# Patient Record
Sex: FEMALE
[De-identification: names, ages, dates, MRNs, and addresses within clinical notes are randomized per-mention and may not be internally consistent; named-entity substitution may affect disease eponyms.]

---

## 2017-04-24 PROBLEM — Z00.00 ENCOUNTER FOR PREVENTIVE HEALTH EXAMINATION: Noted: 2017-04-24

## 2017-04-24 PROBLEM — Z00.00 ENCOUNTER FOR PREVENTIVE HEALTH EXAMINATION: Status: ACTIVE | Noted: 2017-04-24

## 2017-05-01 ENCOUNTER — RX RENEWAL (OUTPATIENT)
Age: 67
End: 2017-05-01

## 2017-06-19 ENCOUNTER — APPOINTMENT (OUTPATIENT)
Dept: ENDOCRINOLOGY | Facility: CLINIC | Age: 67
End: 2017-06-19

## 2017-06-19 ENCOUNTER — LABORATORY RESULT (OUTPATIENT)
Age: 67
End: 2017-06-19

## 2017-06-19 VITALS
SYSTOLIC BLOOD PRESSURE: 138 MMHG | HEART RATE: 62 BPM | BODY MASS INDEX: 41.22 KG/M2 | HEIGHT: 60.5 IN | DIASTOLIC BLOOD PRESSURE: 70 MMHG | WEIGHT: 215.5 LBS

## 2017-06-19 DIAGNOSIS — Z80.0 FAMILY HISTORY OF MALIGNANT NEOPLASM OF DIGESTIVE ORGANS: ICD-10-CM

## 2017-06-22 LAB
T4 FREE SERPL-MCNC: 2 NG/DL
THYROGLOB AB SERPL-ACNC: <20 IU/ML
THYROGLOB SERPL-MCNC: <0.2 NG/ML
TSH SERPL-ACNC: 0.03 UIU/ML

## 2018-02-14 ENCOUNTER — OTHER (OUTPATIENT)
Age: 68
End: 2018-02-14

## 2018-02-22 ENCOUNTER — APPOINTMENT (OUTPATIENT)
Dept: ENDOCRINOLOGY | Facility: CLINIC | Age: 68
End: 2018-02-22
Payer: MEDICARE

## 2018-02-22 VITALS
WEIGHT: 223 LBS | BODY MASS INDEX: 42.65 KG/M2 | SYSTOLIC BLOOD PRESSURE: 143 MMHG | HEIGHT: 60.5 IN | DIASTOLIC BLOOD PRESSURE: 71 MMHG | HEART RATE: 64 BPM

## 2018-02-22 PROCEDURE — 99214 OFFICE O/P EST MOD 30 MIN: CPT

## 2018-03-07 LAB
25(OH)D3 SERPL-MCNC: 23.6 NG/ML
ALBUMIN SERPL ELPH-MCNC: 3.9 G/DL
ALP BLD-CCNC: 122 U/L
ALT SERPL-CCNC: 20 U/L
ANION GAP SERPL CALC-SCNC: 14 MMOL/L
AST SERPL-CCNC: 25 U/L
BILIRUB SERPL-MCNC: 0.4 MG/DL
BUN SERPL-MCNC: 23 MG/DL
CALCIUM SERPL-MCNC: 8.6 MG/DL
CHLORIDE SERPL-SCNC: 104 MMOL/L
CO2 SERPL-SCNC: 25 MMOL/L
CREAT SERPL-MCNC: 0.82 MG/DL
GLUCOSE SERPL-MCNC: 113 MG/DL
POTASSIUM SERPL-SCNC: 4.2 MMOL/L
PROT SERPL-MCNC: 6.2 G/DL
SODIUM SERPL-SCNC: 143 MMOL/L
T4 FREE SERPL-MCNC: 2.2 NG/DL
THYROGLOB AB SERPL-ACNC: <20 IU/ML
THYROGLOB SERPL-MCNC: <0.2 NG/ML
TSH SERPL-ACNC: 0.02 UIU/ML

## 2018-07-23 ENCOUNTER — RESULT REVIEW (OUTPATIENT)
Age: 68
End: 2018-07-23

## 2018-08-20 ENCOUNTER — APPOINTMENT (OUTPATIENT)
Dept: ENDOCRINOLOGY | Facility: CLINIC | Age: 68
End: 2018-08-20
Payer: MEDICARE

## 2018-08-20 ENCOUNTER — LABORATORY RESULT (OUTPATIENT)
Age: 68
End: 2018-08-20

## 2018-08-20 VITALS
HEIGHT: 60.5 IN | DIASTOLIC BLOOD PRESSURE: 69 MMHG | WEIGHT: 224 LBS | BODY MASS INDEX: 42.84 KG/M2 | SYSTOLIC BLOOD PRESSURE: 138 MMHG | HEART RATE: 60 BPM

## 2018-08-20 PROCEDURE — 36415 COLL VENOUS BLD VENIPUNCTURE: CPT

## 2018-08-20 PROCEDURE — 99214 OFFICE O/P EST MOD 30 MIN: CPT | Mod: 25

## 2018-09-24 LAB
T3FREE SERPL-MCNC: 2.66 PG/ML
T4 FREE SERPL-MCNC: 2 NG/DL
THYROGLOB AB SERPL-ACNC: <20 IU/ML
THYROGLOB SERPL-MCNC: <0.2 NG/ML
TSH SERPL-ACNC: 0.04 UIU/ML

## 2018-10-20 ENCOUNTER — RX RENEWAL (OUTPATIENT)
Age: 68
End: 2018-10-20

## 2018-10-20 ENCOUNTER — RESULT CHARGE (OUTPATIENT)
Age: 68
End: 2018-10-20

## 2018-10-20 RX ORDER — LEVOTHYROXINE SODIUM 0.17 MG/1
175 TABLET ORAL DAILY
Qty: 90 | Refills: 2 | Status: DISCONTINUED | COMMUNITY
Start: 2018-02-23 | End: 2018-10-20

## 2018-10-20 RX ORDER — LEVOTHYROXINE SODIUM 0.17 MG/1
175 TABLET ORAL
Qty: 90 | Refills: 2 | Status: DISCONTINUED | COMMUNITY
Start: 2017-05-01 | End: 2018-10-20

## 2019-02-01 ENCOUNTER — OTHER (OUTPATIENT)
Age: 69
End: 2019-02-01

## 2019-03-14 ENCOUNTER — APPOINTMENT (OUTPATIENT)
Dept: ENDOCRINOLOGY | Facility: CLINIC | Age: 69
End: 2019-03-14
Payer: MEDICARE

## 2019-03-14 VITALS
HEART RATE: 64 BPM | WEIGHT: 222 LBS | HEIGHT: 60.5 IN | DIASTOLIC BLOOD PRESSURE: 80 MMHG | BODY MASS INDEX: 42.46 KG/M2 | SYSTOLIC BLOOD PRESSURE: 136 MMHG

## 2019-03-14 PROCEDURE — 99215 OFFICE O/P EST HI 40 MIN: CPT

## 2019-03-14 NOTE — PHYSICAL EXAM
[Alert] : alert [Normal Sclera/Conjunctiva] : normal sclera/conjunctiva [Normal Outer Ear/Nose] : the ears and nose were normal in appearance [No Neck Mass] : no neck mass was observed [No Respiratory Distress] : no respiratory distress [Spine Straight] : spine straight [No Stigmata of Cushings Syndrome] : no stigmata of cushings syndrome [Normal Gait] : normal gait [No Rash] : no rash [Normal Reflexes] : deep tendon reflexes were 2+ and symmetric [Oriented x3] : oriented to person, place, and time

## 2019-03-15 NOTE — HISTORY OF PRESENT ILLNESS
[FreeTextEntry1] : \par 69 y/o F, total thyroidectomy for PTCA, central LN neck dissection,5/8 LN mets ( February 6, 2013). PMHx: HTN, gastric bypass in February 2012, mild elevation of intact PTH, presents today for thyroid f/u. \par \par 3/20/13: I131 operation with 146 mCi\par 3/2013: WBS, distant metastases unlikely\par 8/10/15 thyroid ultrasound: Left side level IV with new nodule no visualized it before\par 6/10/16 thyroid ultrasound, no thyroid bed nodule suspicious appearing lymph nodes\par 7/17/17: thyroid u/s: No nodule seen in the thyroid bed\par 2/22/18:  t4 free 2.2, tsh 0.02,thyroglobulin <0.20, neg thyroglobulin ab\par 2/26/19:  TSH 0.02, total t4 14.1, no thyroglobulin test done \par 2/27/19 thyroid US s/p total thyroidectomy: no residual thyroid tissue within the thyroid bed. \par \par Pt presents today, asymptomatic. Pt denies voice changes, difficulty swallowing. \par No sob, no chest pain\par Medicines, levothyroxine 175 mcg, Amlodipine-benazepril 5-20 mg daily, and vitamins\par

## 2019-03-15 NOTE — END OF VISIT
[>50% of Time Spent on Counseling for ____] : Greater than 50% of the encounter time was spent on counseling for [unfilled] [Time Spent: ___ minutes] : I have spent [unfilled] minutes of face to face time with the patient [FreeTextEntry3] : All medical record entries made by the Scribe were at my, Dr. Medardo Gage, direction and personally dictated by me on 03/14/2019. I have reviewed the chart and agree that the record accurately reflects my personal performance of the history, physical exam, assessment and plan. I have also personally directed, reviewed and agreed with the chart. \par \par

## 2019-03-15 NOTE — ASSESSMENT
[Levothyroxine] : The patient was instructed to take Levothyroxine on an empty stomach, separate from vitamins, and wait at least 30 minutes before eating [Importance of Diet and Exercise] : importance of diet and exercise to improve glycemic control, achieve weight loss and improve cardiovascular health [FreeTextEntry1] : 67 y/o F, total thyroidectomy for PTCA, central LN neck dissection,5/8 LN mets ( February 6, 2013)\par 3/20/13, She received I131 ablation with 146 mCi\par \par 1. No biochemical evidence of recurrence. Pt will continue with 175 mcg and return in 6 months for surveillance. \par \par 2. BMI 42.64. Pt is at increased risk to develop DM and comorbidities associated with obesity. Again discussed benefits of weight loss. Order metabolic profile and A1c. \par \par Return in 6 months.

## 2019-03-15 NOTE — ADDENDUM
[FreeTextEntry1] : I, Rosemary Cummins, acted soley as a scribe for Dr. Medardo Gage on this date. 03/14/2019.

## 2019-03-26 LAB
ALBUMIN SERPL ELPH-MCNC: 4.1 G/DL
ALP BLD-CCNC: 106 U/L
ALT SERPL-CCNC: 31 U/L
ANION GAP SERPL CALC-SCNC: 13 MMOL/L
AST SERPL-CCNC: 44 U/L
BILIRUB SERPL-MCNC: 0.2 MG/DL
BUN SERPL-MCNC: 14 MG/DL
CALCIUM SERPL-MCNC: 8.7 MG/DL
CHLORIDE SERPL-SCNC: 103 MMOL/L
CHOLEST SERPL-MCNC: 202 MG/DL
CHOLEST/HDLC SERPL: 3.1 RATIO
CO2 SERPL-SCNC: 24 MMOL/L
CREAT SERPL-MCNC: 0.81 MG/DL
CREAT SPEC-SCNC: 298 MG/DL
GLUCOSE SERPL-MCNC: 115 MG/DL
HBA1C MFR BLD HPLC: 6 %
HDLC SERPL-MCNC: 66 MG/DL
LDLC SERPL CALC-MCNC: 116 MG/DL
MICROALBUMIN 24H UR DL<=1MG/L-MCNC: 8.9 MG/DL
MICROALBUMIN/CREAT 24H UR-RTO: 30 MG/G
POTASSIUM SERPL-SCNC: 4.4 MMOL/L
PROT SERPL-MCNC: 6.3 G/DL
SODIUM SERPL-SCNC: 140 MMOL/L
TRIGL SERPL-MCNC: 100 MG/DL

## 2019-05-07 ENCOUNTER — OTHER (OUTPATIENT)
Age: 69
End: 2019-05-07

## 2019-09-24 ENCOUNTER — OTHER (OUTPATIENT)
Age: 69
End: 2019-09-24

## 2019-10-28 ENCOUNTER — RESULT REVIEW (OUTPATIENT)
Age: 69
End: 2019-10-28

## 2019-11-04 ENCOUNTER — RX RENEWAL (OUTPATIENT)
Age: 69
End: 2019-11-04

## 2019-11-04 ENCOUNTER — OTHER (OUTPATIENT)
Age: 69
End: 2019-11-04

## 2019-12-03 ENCOUNTER — APPOINTMENT (OUTPATIENT)
Dept: ENDOCRINOLOGY | Facility: CLINIC | Age: 69
End: 2019-12-03
Payer: MEDICARE

## 2019-12-03 VITALS
DIASTOLIC BLOOD PRESSURE: 87 MMHG | HEART RATE: 65 BPM | SYSTOLIC BLOOD PRESSURE: 149 MMHG | WEIGHT: 226 LBS | BODY MASS INDEX: 43.41 KG/M2

## 2019-12-03 PROCEDURE — 99214 OFFICE O/P EST MOD 30 MIN: CPT

## 2019-12-05 NOTE — REVIEW OF SYSTEMS
[Negative] : Endocrine [Recent Weight Gain (___ Lbs)] : recent [unfilled] ~Ulb weight gain [Blurry Vision] : blurred vision [As Noted in HPI] : as noted in HPI [Nocturia] : nocturia [FreeTextEntry4] : denies swallowing/breathing difficulties

## 2019-12-05 NOTE — HISTORY OF PRESENT ILLNESS
[FreeTextEntry1] : 68 y/o year female with hx of total thyroidectomy with central LN neck dissection for PTCA,5/8 LN mets ( February 6, 2013)\par 3/20/13: I131 radioiodine ablation with 146 mCi. 3/2013: WBS, distant metastases unlikely\par 7/17/17: thyroid u/s: No nodule seen in the thyroid bed\par 2/22/18:  t4 free 2.2, tsh 0.02,thyroglobulin <0.20, neg thyroglobulin ab\par 9/30/19 A1c 6.0%, TSH 0.40, Free T4 2.8, Free T3 2.8, Thyroglobulin 0.1, Thyroglobulin ab <1, Vit D 25-OH 23,  s. creat 0.77.\par 11/5/19 Thyroid US: Impression: s/p total thyroidectomy without sonographic evidence of local recurrence or cervical LN \par \par 68 y/o F pt with Hx of total thyroidectomy for PTCA, central LN neck dissection, 5/8 LN mets (February 6, 2013). \par Other PMHx: pre-DM, HTN, gastric bypass in February 2012, mild elevation of intact PTH\par Saw podiatrist and OB/GYN\par \par 3/14/19\par Pt presents today, asymptomatic. Pt denies voice changes, difficulty swallowing. \par No sob, no chest pain\par \par 12/3/19\par Today pt presents for thyroid f/u, feeling well with c/o L knee discomfort, occasional blurry vision, and nocturia which she relates to drinking water more at night. Pt reports she did a mammogram and colonoscopy recently. \par Pt gained 4lbs since her last visit here.\par Denies swallowing/breathing difficulties. \par \par Medicines, levothyroxine 150 mcg, Amlodipine- benazepril 5-20 mg daily, Vitamin D, Vit B complex

## 2019-12-05 NOTE — ASSESSMENT
[Importance of Diet and Exercise] : importance of diet and exercise to improve glycemic control, achieve weight loss and improve cardiovascular health [Levothyroxine] : The patient was instructed to take Levothyroxine on an empty stomach, separate from vitamins, and wait at least 30 minutes before eating [FreeTextEntry1] : 68 y/o F pt with:\par 1. Hx of total thyroidectomy for PTCA, central LN neck dissection, 5/8 LN mets (February 6, 2013):\par Pt received LOUIS ablation 146 mCI in 2013; she is asymptomatic and there is no biochemical evidence of recurrence. Recommend pt continue with Levothyroxine 150mcg with a TSH goal in the low normal.\par \par 2. Hx of pre- DM, with recent A1c of 6%\par Pt is gaining weight, and has limited mobility 2/2 L knee pain. Pt's risk for developing DM and cardiac events has increased; she has high LDL-c. Recommend pt work on lifestyle and diet modification. \par Will initiate Metformin 850mg BID (pt states she will start it after returning from a cruise). If there is no clinical improvement recommend pt return earlier than 1 yr.\par \par Return in 1 yr.

## 2019-12-05 NOTE — END OF VISIT
[>50% of Time Spent on Counseling for ____] : Greater than 50% of the encounter time was spent on counseling for [unfilled] [FreeTextEntry3] : All medical record entries made by the Scribe were at my, Dr. Medardo Gage, direction and personally dictated by me on 12/03/2019. I have reviewed the chart and agree that the record accurately reflects my personal performance of the history, physical exam, assessment and plan. I have also personally directed, reviewed and agreed with the chart.  [Time Spent: ___ minutes] : I have spent [unfilled] minutes of face to face time with the patient

## 2019-12-05 NOTE — ADDENDUM
[FreeTextEntry1] : I, Phi Valdovinos, acted solely as a scribe for Dr. Medardo Gage on this date. 12/03/2019.

## 2019-12-05 NOTE — PHYSICAL EXAM
[Alert] : alert [Normal Sclera/Conjunctiva] : normal sclera/conjunctiva [No Respiratory Distress] : no respiratory distress [Normal Outer Ear/Nose] : the ears and nose were normal in appearance [No Neck Mass] : no neck mass was observed [Spine Straight] : spine straight [No Stigmata of Cushings Syndrome] : no stigmata of cushings syndrome [Normal Gait] : normal gait [No Rash] : no rash [Normal Reflexes] : deep tendon reflexes were 2+ and symmetric [Oriented x3] : oriented to person, place, and time [Thyroid Not Enlarged] : the thyroid was not enlarged [No Thyroid Nodules] : there were no palpable thyroid nodules [Normal S1, S2] : normal S1 and S2 [Clear to Auscultation] : lungs were clear to auscultation bilaterally [Normal Rate] : heart rate was normal  [Pedal Pulses Normal] : the pedal pulses are present [Normal Bowel Sounds] : normal bowel sounds [de-identified] : no masses, no tenderness, no lymph node enlargement

## 2020-09-14 ENCOUNTER — APPOINTMENT (OUTPATIENT)
Dept: ENDOCRINOLOGY | Facility: CLINIC | Age: 70
End: 2020-09-14
Payer: MEDICARE

## 2020-09-14 VITALS
HEIGHT: 60.5 IN | SYSTOLIC BLOOD PRESSURE: 133 MMHG | WEIGHT: 213 LBS | BODY MASS INDEX: 40.74 KG/M2 | HEART RATE: 64 BPM | DIASTOLIC BLOOD PRESSURE: 74 MMHG

## 2020-09-14 DIAGNOSIS — M85.851 OTHER SPECIFIED DISORDERS OF BONE DENSITY AND STRUCTURE, RIGHT THIGH: ICD-10-CM

## 2020-09-14 PROCEDURE — 99215 OFFICE O/P EST HI 40 MIN: CPT

## 2020-09-14 NOTE — REASON FOR VISIT
[Weight Management/Obesity] : weight management/obesity [Follow - Up] : a follow-up visit [Thyroid Cancer] : thyroid cancer [Other___] : [unfilled]

## 2020-09-16 NOTE — PHYSICAL EXAM
[Alert] : alert [Normal Sclera/Conjunctiva] : normal sclera/conjunctiva [Normal Outer Ear/Nose] : the ears and nose were normal in appearance [No Neck Mass] : no neck mass was observed [Thyroid Not Enlarged] : the thyroid was not enlarged [No Thyroid Nodules] : no palpable thyroid nodules [Clear to Auscultation] : lungs were clear to auscultation bilaterally [Normal S1, S2] : normal S1 and S2 [Pedal Pulses Normal] : the pedal pulses are present [Normal Rate] : heart rate was normal [Spine Straight] : spine straight [Normal Bowel Sounds] : normal bowel sounds [No Stigmata of Cushings Syndrome] : no stigmata of Cushings Syndrome [Normal Gait] : normal gait [No Rash] : no rash [Normal Reflexes] : deep tendon reflexes were 2+ and symmetric [Oriented x3] : oriented to person, place, and time [de-identified] : no tenderness, no lymph node enlargement

## 2020-09-16 NOTE — HISTORY OF PRESENT ILLNESS
[FreeTextEntry1] : 3/20/13: I131 radioiodine ablation with 146 mCi. \par 3/2013: WBS, distant metastases unlikely\par 3/29/17 iPTH 111, Ca 8.6, Vit D 25-OH 25 \par 7/17/17: thyroid u/s: No nodule seen in the thyroid bed\par 2/22/18:  t4 free 2.2, tsh 0.02,thyroglobulin <0.20, neg thyroglobulin ab\par 7/9/18 BMD DXA R Proximal Femur T Score is - 2.4.\par 9/30/19 A1c 6.0%, TSH 0.40, Free T4 2.8, Free T3 2.8, Thyroglobulin less than 0.1, Thyroglobulin ab <1, Vit D 25-OH 23, LDL-c 125, s. creat 0.77.\par 11/5/19 Thyroid US: Impression: s/p total thyroidectomy without sonographic evidence of local recurrence or cervical LN \par 8/11/20 A1c 5.6%, Free T4 1.8, Thyroglobulin less than 0.1, TSH 0.07, Ca 8.6, LDL-c 123, Vit D 25- OH 32\par \par 69 y/o F pt with Hx of total thyroidectomy for PTCA, central LN neck dissection, 5/8 LN mets (February 6, 2013). \par Other PMHx: pre-DM, HTN, gastric bypass in February 2012, mild elevation of intact PTH\par Saw podiatrist and OB/GYN\par \par 3/14/19\par Pt presents today, asymptomatic. Pt denies voice changes, difficulty swallowing. \par No sob, no chest pain\par \par 12/3/19\par Today pt presents for thyroid f/u, feeling well with c/o L knee discomfort, occasional blurry vision, and nocturia which she relates to drinking water more at night. Pt reports she did a mammogram and colonoscopy recently. \par Pt gained 4lbs since her last visit here.\par Denies swallowing/breathing difficulties. \par \par 9/14/20\par Today pt presents for thyroid f/u, feeling well with no major physical complaints.\par She notes she saw PCP on 8/24/20; pt states she started SImvastatin 10 mg QD and that she only took Metformin for 2-3 weeks 12 months ago and has discontinued it. \par Pt lost 13lbs since 12/2019.\par \par Current Medications: Levothyroxine 150 mcg, Simvastatin 10 mg QD, Amlodipine- benazepril 5-20 mg daily, Vitamin D, Vit B complex

## 2020-09-16 NOTE — ASSESSMENT
[Importance of Diet and Exercise] : importance of diet and exercise to improve glycemic control, achieve weight loss and improve cardiovascular health [Levothyroxine] : The patient was instructed to take Levothyroxine on an empty stomach, separate from vitamins, and wait at least 30 minutes before eating [Carbohydrate Consistent Diet] : carbohydrate consistent diet [FreeTextEntry1] : 71 y/o F pt with:\par 1. Hx of total thyroidectomy for PTCA, central LN neck dissection, 5/8 LN mets (February 6, 2013):\par Pt received LOUIS ablation 146 mCI in 2013. In 8/2020 Thyroglobulin was less than 0.1 and Free T4 was 1.8. Pt is asymptomatic, will order thyroid US and recommend pt continue LT4 150mcg QD. \par \par 2. Obesity:\par She has made changes to her lifestyle and diet, as result she lost 13lbs. \par \par 3. Pre- DM:\par Pt had an A1c of 6% in 3/2019. Her recent A1c was 5.6% in 8/2020. She was rx Metformin which she took for 3 weeks however she DC/ed 2/2 dizziness symptoms. Encouraged pt to continue her good eating habits, focus on quadriceps muscle bulk and stretching exercises. \par \par 4. Osteopenia with a T- Score - 2.4 at R Femur:\par Recommend pt work on active lifestyle, improve muscle tone of lower extremities, take Vit D and Ca supplements (years ago pt had an increase iPTH with a Vit D-OH of 25). Will order a BMD.\par \par Return in 4- 5 months.

## 2020-09-16 NOTE — END OF VISIT
[Time Spent: ___ minutes] : I have spent [unfilled] minutes of time on the encounter. [>50% of the face to face encounter time was spent on counseling and/or coordination of care for ___] : Greater than 50% of the face to face encounter time was spent on counseling and/or coordination of care for [unfilled] [FreeTextEntry3] : All medical record entries made by the Scribe were at my, Dr. Medardo Gage, direction and personally dictated by me on 09/14/2020. I have reviewed the chart and agree that the record accurately reflects my personal performance of the history, physical exam, assessment and plan. I have also personally directed, reviewed and agreed with the chart.

## 2020-09-16 NOTE — ADDENDUM
[FreeTextEntry1] : I, Phi Valdovinos, acted solely as a scribe for Dr. Medardo Gage on this date. 09/14/2020.

## 2020-12-06 ENCOUNTER — RESULT REVIEW (OUTPATIENT)
Age: 70
End: 2020-12-06

## 2021-10-18 ENCOUNTER — APPOINTMENT (OUTPATIENT)
Dept: ENDOCRINOLOGY | Facility: CLINIC | Age: 71
End: 2021-10-18
Payer: MEDICARE

## 2021-10-18 VITALS
WEIGHT: 213 LBS | SYSTOLIC BLOOD PRESSURE: 154 MMHG | DIASTOLIC BLOOD PRESSURE: 85 MMHG | BODY MASS INDEX: 40.74 KG/M2 | HEIGHT: 60.5 IN | HEART RATE: 64 BPM

## 2021-10-18 PROCEDURE — 99215 OFFICE O/P EST HI 40 MIN: CPT

## 2021-10-18 NOTE — REASON FOR VISIT
[Follow - Up] : a follow-up visit [Weight Management/Obesity] : weight management/obesity [Thyroid Cancer] : thyroid cancer [Other___] : [unfilled]

## 2021-10-20 NOTE — HISTORY OF PRESENT ILLNESS
[FreeTextEntry1] : 69 y/o F pt with Hx of total thyroidectomy for PTCA, central LN neck dissection, 5/8 LN mets (February 6, 2013). \par Other PMHx: pre-DM, HTN, gastric bypass in February 2012, mild elevation of intact PTH\par Saw podiatrist and OB/GYN\par \par 3/14/19\par Pt presents today, asymptomatic. Pt denies voice changes, difficulty swallowing. \par No sob, no chest pain\par \par 12/3/19\par Today pt presents for thyroid f/u, feeling well with c/o L knee discomfort, occasional blurry vision, and nocturia which she relates to drinking water more at night. Pt reports she did a mammogram and colonoscopy recently. \par Pt gained 4lbs since her last visit here.\par Denies swallowing/breathing difficulties. \par \par 9/14/20\par Today pt presents for thyroid f/u, feeling well with no major physical complaints.\par She notes she saw PCP on 8/24/20; pt states she started SImvastatin 10 mg QD and that she only took Metformin for 2-3 weeks 12 months ago and has discontinued it. \par Pt lost 13lbs since 12/2019.\par \par 10/18/21\par The patient reports a positive COVID PCR nasal swab in August following travel to [place]. She had her last COVID vaccine dose in February 2021. Last saw PCP in August 2021, with no reported acute issues. She is feeling well, with no dyspnea, vision issues, or palpitations.\par \par Current Medications: Levothyroxine 150 mcg, Simvastatin 10 mg QD, Amlodipine- benazepril 5-20 mg daily, Vitamin D, Vit B complex\par \par Labs\par 3/20/13: I131 radioiodine ablation with 146 mCi. \par 3/2013: WBS, distant metastases unlikely\par 3/29/17 iPTH 111, Ca 8.6, Vit D 25-OH 25 \par 7/17/17: thyroid u/s: No nodule seen in the thyroid bed\par 2/22/18:  t4 free 2.2, tsh 0.02,thyroglobulin <0.20, neg thyroglobulin ab\par 7/9/18 BMD DXA R Proximal Femur T Score is - 2.4.\par 9/30/19 A1c 6.0%, TSH 0.40, Free T4 2.8, Free T3 2.8, Thyroglobulin less than 0.1, Thyroglobulin ab <1, Vit D 25-OH 23, LDL-c 125, s. creat 0.77.\par 11/5/19 Thyroid US: Impression: s/p total thyroidectomy without sonographic evidence of local recurrence or cervical LN \par 8/11/20 A1c 5.6%, Free T4 1.8, Thyroglobulin less than 0.1, TSH 0.07, Ca 8.6, LDL-c 123, Vit D 25- OH 32\par - 06/29/21: HGB A1c 5.7%, cholesterol 203, LDL-c 106, free T4 1.7, thyroglobulin <0.1, thyroglobulin ab <1, TSH 0.02, creatinine 0.77\par - 10/11/21: thyroglobulin 0.1, thyroglobulin ab <1, T4 total 12.4

## 2021-10-20 NOTE — ADDENDUM
[FreeTextEntry1] : All medical record entries made by the Scribe were at my, Dr. Medardo Gage, direction and personally dictated by me on 10/18/2021. I have reviewed the chart and agree that the record accurately reflects my personal performance of the history, physical exam, assessment and plan. I have also personally directed, reviewed, and agreed with the chart.

## 2021-10-20 NOTE — ASSESSMENT
[FreeTextEntry1] : 70 y/o F pt with:\par \par 1. Hx of total thyroidectomy for PTCA, central LN neck dissection, 5/8 LN mets (February 6, 2013):\par Pt received L[facility]IS ablation 146 mCI in 2013. In 8/2020 Thyroglobulin was less than 0.1 and Free T4 was 1.8. \par Patient is asymptomatic, with no symptoms of upper respiratory obstruction. TFTs done on 10/11/21 show no thyroglobulin.\par Continue LT4 150mcg QD.\par \par 2. Obesity/prediabetes\par Over the past 2 years patient has lost 6 lbs, recent HGB A1c of 5.7%.\par Previously, patient was on metformin, which she developed GI side effects. Over the past few years, we have spoken to patient about increased risk for cardiac and metabolic complications, and today again we spoke about the same and recommend to initiate GLP-1. She states she will obtain more information and discuss with GI/internist.\par \par Return in 1 year [Carbohydrate Consistent Diet] : carbohydrate consistent diet [Weight Loss] : weight loss [Levothyroxine] : The patient was instructed to take Levothyroxine on an empty stomach, separate from vitamins, and wait at least 30 minutes before eating

## 2021-10-20 NOTE — PHYSICAL EXAM
[Alert] : alert [Normal Sclera/Conjunctiva] : normal sclera/conjunctiva [Normal Outer Ear/Nose] : the ears and nose were normal in appearance [No Neck Mass] : no neck mass was observed [Thyroid Not Enlarged] : the thyroid was not enlarged [No Thyroid Nodules] : no palpable thyroid nodules [Clear to Auscultation] : lungs were clear to auscultation bilaterally [Normal S1, S2] : normal S1 and S2 [Normal Rate] : heart rate was normal [Pedal Pulses Normal] : the pedal pulses are present [Normal Bowel Sounds] : normal bowel sounds [Spine Straight] : spine straight [No Stigmata of Cushings Syndrome] : no stigmata of Cushings Syndrome [Normal Gait] : normal gait [No Rash] : no rash [Normal Reflexes] : deep tendon reflexes were 2+ and symmetric [Oriented x3] : oriented to person, place, and time [de-identified] : no tenderness, no lymph node enlargement

## 2022-10-24 ENCOUNTER — LABORATORY RESULT (OUTPATIENT)
Age: 72
End: 2022-10-24

## 2022-10-24 ENCOUNTER — APPOINTMENT (OUTPATIENT)
Dept: ENDOCRINOLOGY | Facility: CLINIC | Age: 72
End: 2022-10-24

## 2022-10-24 VITALS
SYSTOLIC BLOOD PRESSURE: 151 MMHG | DIASTOLIC BLOOD PRESSURE: 71 MMHG | BODY MASS INDEX: 40.15 KG/M2 | HEART RATE: 76 BPM | WEIGHT: 209 LBS

## 2022-10-24 PROCEDURE — 36415 COLL VENOUS BLD VENIPUNCTURE: CPT

## 2022-10-24 PROCEDURE — 99215 OFFICE O/P EST HI 40 MIN: CPT | Mod: 25

## 2022-10-24 NOTE — REASON FOR VISIT
[Follow - Up] : a follow-up visit [Weight Management/Obesity] : weight management/obesity [Thyroid Cancer] : thyroid cancer [Other___] : [unfilled] [DM Type 2] : DM Type 2

## 2022-10-25 NOTE — PHYSICAL EXAM
[Alert] : alert [Normal Sclera/Conjunctiva] : normal sclera/conjunctiva [Normal Outer Ear/Nose] : the ears and nose were normal in appearance [No Neck Mass] : no neck mass was observed [Thyroid Not Enlarged] : the thyroid was not enlarged [No Thyroid Nodules] : no palpable thyroid nodules [Clear to Auscultation] : lungs were clear to auscultation bilaterally [Normal S1, S2] : normal S1 and S2 [Normal Rate] : heart rate was normal [Pedal Pulses Normal] : the pedal pulses are present [Normal Bowel Sounds] : normal bowel sounds [Spine Straight] : spine straight [No Stigmata of Cushings Syndrome] : no stigmata of Cushings Syndrome [Normal Gait] : normal gait [No Rash] : no rash [Normal Reflexes] : deep tendon reflexes were 2+ and symmetric [Oriented x3] : oriented to person, place, and time [de-identified] : no tenderness, no lymph node enlargement

## 2022-10-25 NOTE — ADDENDUM
[FreeTextEntry1] : I Joelsuraj Carr act soley as a scribe for Dr. Medardo Gage on this date. 10/24/2022

## 2022-10-25 NOTE — REVIEW OF SYSTEMS
[Recent Weight Loss (___ Lbs)] : recent weight loss: [unfilled] lbs [Negative] : Heme/Lymph [As Noted in HPI] : as noted in HPI [Dysphagia] : no dysphagia [Dysphonia] : no dysphonia [Difficulty Breathing] : no dyspnea [de-identified] : Itchiness in face.

## 2022-10-25 NOTE — ASSESSMENT
[Levothyroxine] : The patient was instructed to take Levothyroxine on an empty stomach, separate from vitamins, and wait at least 30 minutes before eating [FreeTextEntry1] : \par  [Carbohydrate Consistent Diet] : carbohydrate consistent diet [Importance of Diet and Exercise] : importance of diet and exercise to improve glycemic control, achieve weight loss and improve cardiovascular health [Exercise/Effect on Glucose] : exercise/effect on glucose

## 2022-10-25 NOTE — END OF VISIT
[FreeTextEntry3] : All medical record entries made by the Scribe were at my, Dr. Medardo Gage, direction and personally dictated by me on 10/24/2022. I have reviewed the chart and agree that the record accurately reflects my personal performance of the history, physical exam, assessment and plan. I have also personally directed, reviewed and agreed with the chart.  [Time Spent: ___ minutes] : I have spent [unfilled] minutes of time on the encounter.

## 2022-10-25 NOTE — DATA REVIEWED
[FreeTextEntry1] : Labs:\par - 10/06/22: A1c 5.7% (H), LDL-c 97\par - 07/14/22: A1c 7.1%\par - 01/21/22: A1c 5.9%\par - 10/11/21: thyroglobulin 0.1, thyroglobulin ab <1, T4 total 12.4\par - 06/29/21: HGB A1c 5.7%, cholesterol 203, LDL-c 106, free T4 1.7, thyroglobulin <0.1, thyroglobulin ab <1, TSH 0.02, creatinine 0.77\par - 8/11/20 A1c 5.6%, Free T4 1.8, Thyroglobulin less than 0.1, TSH 0.07, Ca 8.6, LDL-c 123, Vit D 25- OH 32\par - 9/30/19 A1c 6.0%, TSH 0.40, Free T4 2.8, Free T3 2.8, Thyroglobulin less than 0.1, Thyroglobulin ab <1, Vit D 25-OH 23, LDL-c 125, s. creat 0.77.\par - 2/22/18:  t4 free 2.2, tsh 0.02,thyroglobulin <0.20, neg thyroglobulin ab\par - 3/29/17 iPTH 111, Ca 8.6, Vit D 25-OH 25 \par - 3/20/13: I131 radioiodine ablation with 146 mCi. \par - 3/2013: WBS, distant metastases unlikely\par \par Imaging:\par - 11/5/19 Thyroid US: Impression: s/p total thyroidectomy without sonographic evidence of local recurrence or cervical LN \par - 7/9/18 BMD DXA R Proximal Femur T Score is - 2.4.\par - 7/17/17: thyroid u/s: No nodule seen in the thyroid bed\par

## 2022-10-27 ENCOUNTER — NON-APPOINTMENT (OUTPATIENT)
Age: 72
End: 2022-10-27

## 2022-11-20 LAB
T4 FREE SERPL-MCNC: 2.2 NG/DL
THYROGLOB AB SERPL-ACNC: <20 IU/ML
THYROGLOB SERPL-MCNC: <0.2 NG/ML
TSH SERPL-ACNC: 0.01 UIU/ML

## 2022-12-06 ENCOUNTER — NON-APPOINTMENT (OUTPATIENT)
Age: 72
End: 2022-12-06

## 2023-04-24 ENCOUNTER — APPOINTMENT (OUTPATIENT)
Dept: ENDOCRINOLOGY | Facility: CLINIC | Age: 73
End: 2023-04-24

## 2023-07-18 ENCOUNTER — APPOINTMENT (OUTPATIENT)
Dept: ENDOCRINOLOGY | Facility: CLINIC | Age: 73
End: 2023-07-18
Payer: MEDICARE

## 2023-07-18 ENCOUNTER — LABORATORY RESULT (OUTPATIENT)
Age: 73
End: 2023-07-18

## 2023-07-18 VITALS
HEART RATE: 69 BPM | SYSTOLIC BLOOD PRESSURE: 121 MMHG | BODY MASS INDEX: 40.34 KG/M2 | DIASTOLIC BLOOD PRESSURE: 81 MMHG | WEIGHT: 210 LBS

## 2023-07-18 PROCEDURE — 82962 GLUCOSE BLOOD TEST: CPT

## 2023-07-18 PROCEDURE — 99215 OFFICE O/P EST HI 40 MIN: CPT | Mod: 25

## 2023-07-18 PROCEDURE — 83036 HEMOGLOBIN GLYCOSYLATED A1C: CPT | Mod: QW

## 2023-07-18 RX ORDER — SEMAGLUTIDE 0.68 MG/ML
2 INJECTION, SOLUTION SUBCUTANEOUS
Qty: 1 | Refills: 5 | Status: ACTIVE | COMMUNITY
Start: 2023-07-18 | End: 1900-01-01

## 2023-07-18 NOTE — REASON FOR VISIT
[Follow - Up] : a follow-up visit [DM Type 2] : DM Type 2 [Weight Management/Obesity] : weight management/obesity [Thyroid Cancer] : thyroid cancer [Other___] : [unfilled]

## 2023-07-20 ENCOUNTER — NON-APPOINTMENT (OUTPATIENT)
Age: 73
End: 2023-07-20

## 2023-07-21 NOTE — DATA REVIEWED
[FreeTextEntry1] : Labs:\par - 07/10/23: Free T4 1.49, Thyroglobulin ab is negative  \par - 05/24/23: A1c 6.4%, Ca 8.9, LDL-c 116, s.creat 0.79, TSH 4.5, Free t4 1.51   \par - 10/06/22: A1c 5.7% (H), LDL-c 97\par - 07/14/22: A1c 7.1%\par - 01/21/22: A1c 5.9%\par - 10/11/21: thyroglobulin 0.1, thyroglobulin ab <1, T4 total 12.4\par - 06/29/21: HGB A1c 5.7%, cholesterol 203, LDL-c 106, free T4 1.7, thyroglobulin <0.1, thyroglobulin ab <1, TSH 0.02, creatinine 0.77\par - 8/11/20 A1c 5.6%, Free T4 1.8, Thyroglobulin less than 0.1, TSH 0.07, Ca 8.6, LDL-c 123, Vit D 25- OH 32\par - 9/30/19 A1c 6.0%, TSH 0.40, Free T4 2.8, Free T3 2.8, Thyroglobulin less than 0.1, Thyroglobulin ab <1, Vit D 25-OH 23, LDL-c 125, s. creat 0.77.\par - 2/22/18:  t4 free 2.2, tsh 0.02,thyroglobulin <0.20, neg thyroglobulin ab\par - 3/29/17 iPTH 111, Ca 8.6, Vit D 25-OH 25 \par - 3/20/13: I131 radioiodine ablation with 146 mCi. \par - 3/2013: WBS, distant metastases unlikely\par \par Imaging:\par - Thyroid US 07/12/23: (Done in [place]): Thyroid gland is surgically absent. No residual or recurrent thyroid tissue Impression: Total thyroidectomy. There is no lymphadenopathy. \par - 11/5/19 Thyroid US: Impression: s/p total thyroidectomy without sonographic evidence of local recurrence or cervical LN \par - 7/9/18 BMD DXA R Proximal Femur T Score is - 2.4.\par - 7/17/17: thyroid u/s: No nodule seen in the thyroid bed

## 2023-07-21 NOTE — PHYSICAL EXAM
[Alert] : alert [Normal Sclera/Conjunctiva] : normal sclera/conjunctiva [Normal Outer Ear/Nose] : the ears and nose were normal in appearance [No Neck Mass] : no neck mass was observed [Thyroid Not Enlarged] : the thyroid was not enlarged [No Thyroid Nodules] : no palpable thyroid nodules [Clear to Auscultation] : lungs were clear to auscultation bilaterally [Normal S1, S2] : normal S1 and S2 [Normal Rate] : heart rate was normal [Pedal Pulses Normal] : the pedal pulses are present [Normal Bowel Sounds] : normal bowel sounds [Spine Straight] : spine straight [No Stigmata of Cushings Syndrome] : no stigmata of Cushings Syndrome [Normal Gait] : normal gait [No Rash] : no rash [Normal Reflexes] : deep tendon reflexes were 2+ and symmetric [Oriented x3] : oriented to person, place, and time [de-identified] : no tenderness, no lymph node enlargement

## 2023-07-21 NOTE — ADDENDUM
[FreeTextEntry1] : I, Shelley Jones, acted solely as a scribe for Dr. Medardo Gage on this date 07/18/2023

## 2023-07-21 NOTE — ASSESSMENT
[Carbohydrate Consistent Diet] : carbohydrate consistent diet [Importance of Diet and Exercise] : importance of diet and exercise to improve glycemic control, achieve weight loss and improve cardiovascular health [Exercise/Effect on Glucose] : exercise/effect on glucose [Levothyroxine] : The patient was instructed to take Levothyroxine on an empty stomach, separate from vitamins, and wait at least 30 minutes before eating [Weight Loss] : weight loss [FreeTextEntry1] : \par

## 2023-07-21 NOTE — END OF VISIT
[FreeTextEntry3] : All medical record entries made by the Scribe were at my, Dr. Medardo Gage, direction and personally dictated by me on 07/18/2023. I have reviewed the chart and agree that the record accurately reflects my personal performance of the history, physical exam, assessment and plan. I have also personally, directed, reviewed and agreed with the chart  [Time Spent: ___ minutes] : I have spent [unfilled] minutes of time on the encounter.

## 2023-07-21 NOTE — HISTORY OF PRESENT ILLNESS
[FreeTextEntry1] : 74 y/o F pt with Hx of total thyroidectomy for PTCA, central LN neck dissection, 5/8 LN mets (February 6, 2013). \par Other PMHx: pre-DM, HTN, gastric bypass in February 2012, mild elevation of intact PTH, knee arthritis. Denies: sleep apnea disorder, fatty liver disease, gout, osteoporosis, fractures. \par Vaccination: COVID (02/2021)\par Saw podiatrist and OB/GYN\par \par 10/24/22\par Pt has /71, and BMI 40.15. She lost 4 lbs in 1 year. \par Today, pt is feeling okay, wit c/o hair loss and itchiness in the face. \par Pt notes self-discontinuing Metformin because of side effects. A1c increased to 7.1%, so pt re-tried metformin. Held again because it made her "sick". She worked on her diet and lifestyle to control BS ever since. \par Sees PCP from "[facility]"? Simvastatin was increased to 20 mg from 10 mg. \par Denies breathing difficulties, dysphagia, and dysphonia.  \par \par 07/18/2023\par CC: " I am feeling pretty good. " Pt endorses knee pain when walking. She has seen an orthopedic doctor and will consider knee replacement. \par No speech, swallowing or breathing difficulties.  \par  \par [Medications verified on 07/18/2023 as per pt] \par Current Medications: Levothyroxine 100 mcg, Simvastatin 20 mg QD, Amlodipine- benazepril 10-20 mg daily, Vitamin D, Vit B complex\par - Held: metformin (GI side effects)

## 2023-07-21 NOTE — REVIEW OF SYSTEMS
[As Noted in HPI] : as noted in HPI [Joint Pain] : joint pain [Negative] : Constitutional [Dysphagia] : no dysphagia [Dysphonia] : no dysphonia [Difficulty Breathing] : no dyspnea [de-identified] : Itchiness in face.

## 2023-07-28 ENCOUNTER — NON-APPOINTMENT (OUTPATIENT)
Age: 73
End: 2023-07-28

## 2023-08-01 LAB
GLUCOSE BLDC GLUCOMTR-MCNC: 116
HBA1C MFR BLD HPLC: 5.6
T4 FREE SERPL-MCNC: 1.6 NG/DL
THYROGLOB AB SERPL-ACNC: <20 IU/ML
THYROGLOB SERPL-MCNC: <0.2 NG/ML
TSH SERPL-ACNC: 3.52 UIU/ML

## 2023-11-04 RX ORDER — LEVOTHYROXINE SODIUM 0.12 MG/1
125 TABLET ORAL
Qty: 90 | Refills: 3 | Status: DISCONTINUED | COMMUNITY
Start: 2018-10-20 | End: 2023-11-04

## 2024-01-07 DIAGNOSIS — R73.03 PREDIABETES.: ICD-10-CM

## 2024-01-08 ENCOUNTER — APPOINTMENT (OUTPATIENT)
Dept: ENDOCRINOLOGY | Facility: CLINIC | Age: 74
End: 2024-01-08

## 2024-01-09 NOTE — HISTORY OF PRESENT ILLNESS
[FreeTextEntry1] : 71 y/o F pt with Hx of total thyroidectomy for PTCA, central LN neck dissection, 5/8 LN mets (February 6, 2013). \par Other PMHx: pre-DM, HTN, gastric bypass in February 2012, mild elevation of intact PTH, knee arthritis. Denies: sleep apnea disorder, fatty liver disease, gout, osteoporosis, fractures. \par Vaccination: COVID (02/2021)\par Saw podiatrist and OB/GYN\par \par 10/24/22\par Pt has /71, and BMI 40.15. She lost 4 lbs in 1 year. \par Today, pt is feeling okay, wit c/o hair loss and itchiness in the face. \par Pt notes self-discontinuing Metformin because of side effects. A1c increased to 7.1%, so pt re-tried metformin. Held again because it made her "sick". She worked on her diet and lifestyle to control BS ever since. \par Sees PCP from "[facility]"? Simvastatin was increased to 20 mg from 10 mg. \par Denies breathing difficulties, dysphagia, and dysphonia. \par \par Current Medications: Levothyroxine 150 mcg, Simvastatin 20 mg QD, Amlodipine- benazepril 10-20 mg daily, Vitamin D, Vit B complex\par - Held: metformin (GI side effects)\par  No

## 2024-04-15 ENCOUNTER — APPOINTMENT (OUTPATIENT)
Dept: ENDOCRINOLOGY | Facility: CLINIC | Age: 74
End: 2024-04-15
Payer: MEDICARE

## 2024-04-15 VITALS — SYSTOLIC BLOOD PRESSURE: 120 MMHG | DIASTOLIC BLOOD PRESSURE: 72 MMHG | HEART RATE: 78 BPM

## 2024-04-15 DIAGNOSIS — E66.01 MORBID (SEVERE) OBESITY DUE TO EXCESS CALORIES: ICD-10-CM

## 2024-04-15 DIAGNOSIS — E89.0 POSTPROCEDURAL HYPOTHYROIDISM: ICD-10-CM

## 2024-04-15 DIAGNOSIS — E11.9 TYPE 2 DIABETES MELLITUS W/OUT COMPLICATIONS: ICD-10-CM

## 2024-04-15 PROCEDURE — 82962 GLUCOSE BLOOD TEST: CPT

## 2024-04-15 PROCEDURE — 99215 OFFICE O/P EST HI 40 MIN: CPT | Mod: 25

## 2024-04-15 NOTE — PHYSICAL EXAM
[Alert] : alert [Normal Sclera/Conjunctiva] : normal sclera/conjunctiva [Normal Outer Ear/Nose] : the ears and nose were normal in appearance [No Neck Mass] : no neck mass was observed [Thyroid Not Enlarged] : the thyroid was not enlarged [No Thyroid Nodules] : no palpable thyroid nodules [Clear to Auscultation] : lungs were clear to auscultation bilaterally [Normal S1, S2] : normal S1 and S2 [Normal Rate] : heart rate was normal [Normal Bowel Sounds] : normal bowel sounds [Pedal Pulses Normal] : the pedal pulses are present [Spine Straight] : spine straight [No Stigmata of Cushings Syndrome] : no stigmata of Cushings Syndrome [Normal Gait] : normal gait [No Rash] : no rash [Normal Reflexes] : deep tendon reflexes were 2+ and symmetric [Oriented x3] : oriented to person, place, and time [de-identified] : no tenderness, no lymph node enlargement

## 2024-04-15 NOTE — END OF VISIT
[FreeTextEntry3] :  All medical record entries made by the Scribe were at my, Dr. Medardo Gage, direction and personally dictated by me on 04/15/2024. I have reviewed the chart and agree that the record accurately reflects my personal performance of the history, physical exam, assessment and plan. I have also personally directed, reviewed and agreed with the chart. [Time Spent: ___ minutes] : I have spent [unfilled] minutes of time on the encounter.

## 2024-04-15 NOTE — HISTORY OF PRESENT ILLNESS
[FreeTextEntry1] : 73 y/o F pt with Hx of total thyroidectomy for PTCA, central LN neck dissection, 5/8 LN mets (February 6, 2013).  Other PMHx: pre-DM, HTN, gastric bypass in February 2012, mild elevation of intact PTH, knee arthritis. Denies: sleep apnea disorder, fatty liver disease, gout, osteoporosis, fractures.  Vaccination: COVID (02/2021) Saw podiatrist and OB/GYN  10/24/22 Pt has /71, and BMI 40.15. She lost 4 lbs in 1 year.  Today, pt is feeling okay, wit c/o hair loss and itchiness in the face.  Pt notes self-discontinuing Metformin because of side effects. A1c increased to 7.1%, so pt re-tried metformin. Held again because it made her "sick". She worked on her diet and lifestyle to control BS ever since.  Sees PCP from "[facility]"? Simvastatin was increased to 20 mg from 10 mg.  Denies breathing difficulties, dysphagia, and dysphonia.    07/18/2023 CC: " I am feeling pretty good. " Pt endorses knee pain when walking. She has seen an orthopedic doctor and will consider knee replacement.  No speech, swallowing or breathing difficulties.     04/15/2024  Pt has POCT 193, /72. CC: "I'm doing alright. Pt reports that she held Ozempic after 2 weeks because she was nervous about the side effects. She states that she hurt her ankle recently and will follow up with a podiatrist.  [Medications verified as per pt on 04/15/2024] Current Medications: Levothyroxine 100 mcg, Rosuvastatin 10 mg qd, Amlodipine- benazepril 10-20 mg daily, Vitamin D, Vit B complex - Held: metformin (GI side effects), Simvastatin 20 mg qd Medication modified/added this visit: Levothyroxine 112 mcg (increased from Levothyroxine 100 mcg), initiate Metformin 850 mg bid

## 2024-04-15 NOTE — ASSESSMENT
[Carbohydrate Consistent Diet] : carbohydrate consistent diet [Importance of Diet and Exercise] : importance of diet and exercise to improve glycemic control, achieve weight loss and improve cardiovascular health [Exercise/Effect on Glucose] : exercise/effect on glucose [Weight Loss] : weight loss [Levothyroxine] : The patient was instructed to take Levothyroxine on an empty stomach, separate from vitamins, and wait at least 30 minutes before eating [FreeTextEntry1] : \par

## 2024-04-15 NOTE — DATA REVIEWED
[FreeTextEntry1] : Labs: - 04/04/24 s. creat 0.99, eGFR 59, LDL-c 106, ACR 7, A1c 6.3%, TSH 6.7, no thyroblogulin - 07/10/23: Free T4 1.49, Thyroglobulin ab is negative   - 05/24/23: A1c 6.4%, Ca 8.9, LDL-c 116, s.creat 0.79, TSH 4.5, Free t4 1.51    - 10/06/22: A1c 5.7% (H), LDL-c 97 - 07/14/22: A1c 7.1% - 01/21/22: A1c 5.9% - 10/11/21: thyroglobulin 0.1, thyroglobulin ab <1, T4 total 12.4 - 06/29/21: HGB A1c 5.7%, cholesterol 203, LDL-c 106, free T4 1.7, thyroglobulin <0.1, thyroglobulin ab <1, TSH 0.02, creatinine 0.77 - 8/11/20 A1c 5.6%, Free T4 1.8, Thyroglobulin less than 0.1, TSH 0.07, Ca 8.6, LDL-c 123, Vit D 25- OH 32 - 9/30/19 A1c 6.0%, TSH 0.40, Free T4 2.8, Free T3 2.8, Thyroglobulin less than 0.1, Thyroglobulin ab <1, Vit D 25-OH 23, LDL-c 125, s. creat 0.77. - 2/22/18:  t4 free 2.2, tsh 0.02,thyroglobulin <0.20, neg thyroglobulin ab - 3/29/17 iPTH 111, Ca 8.6, Vit D 25-OH 25  - 3/20/13: I131 radioiodine ablation with 146 mCi.  - 3/2013: WBS, distant metastases unlikely  Imaging: - Thyroid US 07/12/23: (Done in [place]): Thyroid gland is surgically absent. No residual or recurrent thyroid tissue Impression: Total thyroidectomy. There is no lymphadenopathy.  - 11/5/19 Thyroid US: Impression: s/p total thyroidectomy without sonographic evidence of local recurrence or cervical LN  - 7/9/18 BMD DXA R Proximal Femur T Score is - 2.4. - 7/17/17: thyroid u/s: No nodule seen in the thyroid bed

## 2024-04-16 LAB — GLUCOSE BLDC GLUCOMTR-MCNC: 193

## 2024-04-16 RX ORDER — METFORMIN HYDROCHLORIDE 850 MG/1
850 TABLET, COATED ORAL
Qty: 180 | Refills: 2 | Status: ACTIVE | COMMUNITY
Start: 2019-12-03 | End: 1900-01-01

## 2024-05-04 RX ORDER — LEVOTHYROXINE SODIUM 0.11 MG/1
112 TABLET ORAL
Qty: 90 | Refills: 1 | Status: ACTIVE | COMMUNITY
Start: 2022-12-06 | End: 1900-01-01

## 2024-08-12 ENCOUNTER — APPOINTMENT (OUTPATIENT)
Dept: ENDOCRINOLOGY | Facility: CLINIC | Age: 74
End: 2024-08-12
Payer: MEDICARE

## 2024-08-12 VITALS
SYSTOLIC BLOOD PRESSURE: 144 MMHG | HEIGHT: 60.5 IN | WEIGHT: 206 LBS | DIASTOLIC BLOOD PRESSURE: 84 MMHG | BODY MASS INDEX: 39.4 KG/M2 | HEART RATE: 74 BPM

## 2024-08-12 DIAGNOSIS — E89.0 POSTPROCEDURAL HYPOTHYROIDISM: ICD-10-CM

## 2024-08-12 DIAGNOSIS — E66.01 MORBID (SEVERE) OBESITY DUE TO EXCESS CALORIES: ICD-10-CM

## 2024-08-12 DIAGNOSIS — E11.9 TYPE 2 DIABETES MELLITUS W/OUT COMPLICATIONS: ICD-10-CM

## 2024-08-12 LAB — GLUCOSE BLDC GLUCOMTR-MCNC: 156

## 2024-08-12 PROCEDURE — G2211 COMPLEX E/M VISIT ADD ON: CPT

## 2024-08-12 PROCEDURE — 99215 OFFICE O/P EST HI 40 MIN: CPT

## 2024-08-12 PROCEDURE — 82962 GLUCOSE BLOOD TEST: CPT

## 2024-08-12 NOTE — REASON FOR VISIT
[Follow - Up] : a follow-up visit [DM Type 2] : DM Type 2 [Weight Management/Obesity] : weight management/obesity [Thyroid Cancer] : thyroid cancer

## 2024-08-12 NOTE — ASSESSMENT
[Carbohydrate Consistent Diet] : carbohydrate consistent diet [Importance of Diet and Exercise] : importance of diet and exercise to improve glycemic control, achieve weight loss and improve cardiovascular health [Exercise/Effect on Glucose] : exercise/effect on glucose [Weight Loss] : weight loss [Levothyroxine] : The patient was instructed to take Levothyroxine on an empty stomach, separate from vitamins, and wait at least 30 minutes before eating [FreeTextEntry1] :  hx of total thyroidectomy keep everything until/  receved 146 in 2013.  07/30/24 thyroglublin <1. TSH 2.43. No clinical or strucutal evidence of recurrence.  Plan: - Continue Levothyroxine 112 mcg qd.   DM 07/30/24 A1c 6.1% ldlc 78. Pt is currently on Metformin qd and will restart Ozempic.    BMI 39.57 hx of HLD, HTN, No known hx of CAD.  restart Ozempic 0.25 mg qw.  226 in 12/2019. Today is 206. Pt states that Metformin makes her feel unwell and she was concerned about possible side effects after Follows up with ortho surgeon with plan for bilateral knee replacement. She was advised to lose a significant amountof weight for better outcomes. We doiscussed strategies for weight reduction including nutritiionist, caloric restriction, and utilizing GLP-1. Pt was agreeable to retyry Ozempic 0.25 mg  Plan: - Increase Ozempic to 0.5 mg qw after 4 weeks. .cont  Return in: 6 months.

## 2024-08-14 NOTE — PHYSICAL EXAM
[Alert] : alert [Normal Sclera/Conjunctiva] : normal sclera/conjunctiva [Normal Outer Ear/Nose] : the ears and nose were normal in appearance [No Neck Mass] : no neck mass was observed [Thyroid Not Enlarged] : the thyroid was not enlarged [No Thyroid Nodules] : no palpable thyroid nodules [Clear to Auscultation] : lungs were clear to auscultation bilaterally [Normal S1, S2] : normal S1 and S2 [Normal Rate] : heart rate was normal [Pedal Pulses Normal] : the pedal pulses are present [Normal Bowel Sounds] : normal bowel sounds [Spine Straight] : spine straight [No Stigmata of Cushings Syndrome] : no stigmata of Cushings Syndrome [Normal Gait] : normal gait [No Rash] : no rash [Normal Reflexes] : deep tendon reflexes were 2+ and symmetric [Oriented x3] : oriented to person, place, and time [de-identified] : no tenderness, no lymph node enlargement

## 2024-08-14 NOTE — END OF VISIT
[FreeTextEntry3] :  All medical record entries made by the Scribe were at my, Dr. Medardo Gage, direction and personally dictated by me on 08/12/2024. I have reviewed the chart and agree that the record accurately reflects my personal performance of the history, physical exam, assessment and plan. I have also personally directed, reviewed and agreed with the chart. [Time Spent: ___ minutes] : I have spent [unfilled] minutes of time on the encounter.

## 2024-08-14 NOTE — PHYSICAL EXAM
[Alert] : alert [Normal Sclera/Conjunctiva] : normal sclera/conjunctiva [Normal Outer Ear/Nose] : the ears and nose were normal in appearance [No Neck Mass] : no neck mass was observed [Thyroid Not Enlarged] : the thyroid was not enlarged [No Thyroid Nodules] : no palpable thyroid nodules [Clear to Auscultation] : lungs were clear to auscultation bilaterally [Normal S1, S2] : normal S1 and S2 [Normal Rate] : heart rate was normal [Pedal Pulses Normal] : the pedal pulses are present [Normal Bowel Sounds] : normal bowel sounds [Spine Straight] : spine straight [No Stigmata of Cushings Syndrome] : no stigmata of Cushings Syndrome [Normal Gait] : normal gait [No Rash] : no rash [Normal Reflexes] : deep tendon reflexes were 2+ and symmetric [Oriented x3] : oriented to person, place, and time [de-identified] : no tenderness, no lymph node enlargement

## 2024-08-14 NOTE — HISTORY OF PRESENT ILLNESS
[FreeTextEntry1] : 73 y/o F pt with Hx of total thyroidectomy for PTCA, central LN neck dissection, 5/8 LN mets (February 6, 2013).  Other PMHx: pre-DM, HTN, gastric bypass in February 2012, mild elevation of intact PTH, knee arthritis. Denies: sleep apnea disorder, fatty liver disease, gout, osteoporosis, fractures.  Vaccination: COVID (02/2021) Saw podiatrist and OB/GYN  10/24/22 Pt has /71, and BMI 40.15. She lost 4 lbs in 1 year.  Today, pt is feeling okay, wit c/o hair loss and itchiness in the face.  Pt notes self-discontinuing Metformin because of side effects. A1c increased to 7.1%, so pt re-tried metformin. Held again because it made her "sick". She worked on her diet and lifestyle to control BS ever since.  Sees PCP from "[facility]"? Simvastatin was increased to 20 mg from 10 mg.  Denies breathing difficulties, dysphagia, and dysphonia.    07/18/2023 CC: " I am feeling pretty good. " Pt endorses knee pain when walking. She has seen an orthopedic doctor and will consider knee replacement.  No speech, swallowing or breathing difficulties.     04/15/2024  Pt has POCT 193, /72. CC: "I'm doing alright. Pt reports that she held Ozempic after 2 weeks because she was nervous about the side effects. She states that she hurt her ankle recently and will follow up with a podiatrist.  08/12/2024  Pt has POCT 156, /84 and BMI 39.57. She gained 4 lbs in 12 months. CC: "I lost a few lbs and I'm going to orthopedics to consider a knee replacement at [place] due to bone-on-bone." Pt states that her surgeon wanted her to lower her BMI/weight for better outcomes. She recently received steroid shots last week. Pt had tried Ozempic for 2 weeks in the past, but she was worried about the side effects seen on television. As a result, she is taking Metformin once at night (daytime use affected her mood).  Pt has plans to travel to [place] and [facility].  - 07/30/24 s. creat 0.83, LDL-c 78, A1c 6.1%, TSH 2.43, free T4 1.64, thyroglobulin negative.  [Medications verified as per pt on 08/12/2024] Current Medications: Metformin 850 mg qpm, Levothyroxine 112 mcg (increased from Levothyroxine 100 mcg), Rosuvastatin 10 mg qd, Amlodipine- benazepril 10-20 mg daily, Vitamin D, Vit B complex - Held: metformin (GI side effects), Simvastatin 20 mg qd Medication modified/added this visit:

## 2024-08-14 NOTE — PHYSICAL EXAM
[Alert] : alert [Normal Sclera/Conjunctiva] : normal sclera/conjunctiva [Normal Outer Ear/Nose] : the ears and nose were normal in appearance [No Neck Mass] : no neck mass was observed [Thyroid Not Enlarged] : the thyroid was not enlarged [No Thyroid Nodules] : no palpable thyroid nodules [Clear to Auscultation] : lungs were clear to auscultation bilaterally [Normal S1, S2] : normal S1 and S2 [Normal Rate] : heart rate was normal [Pedal Pulses Normal] : the pedal pulses are present [Normal Bowel Sounds] : normal bowel sounds [Spine Straight] : spine straight [No Stigmata of Cushings Syndrome] : no stigmata of Cushings Syndrome [Normal Gait] : normal gait [No Rash] : no rash [Normal Reflexes] : deep tendon reflexes were 2+ and symmetric [Oriented x3] : oriented to person, place, and time [de-identified] : no tenderness, no lymph node enlargement

## 2024-08-14 NOTE — HISTORY OF PRESENT ILLNESS
[FreeTextEntry1] : 73 y/o F pt with Hx of total thyroidectomy for PTCA, central LN neck dissection, 5/8 LN mets (February 6, 2013).  Other PMHx: pre-DM, HTN, gastric bypass in February 2012, mild elevation of intact PTH, knee arthritis. Denies: sleep apnea disorder, fatty liver disease, gout, osteoporosis, fractures.  Vaccination: COVID (02/2021) Saw podiatrist and OB/GYN  10/24/22 Pt has /71, and BMI 40.15. She lost 4 lbs in 1 year.  Today, pt is feeling okay, wit c/o hair loss and itchiness in the face.  Pt notes self-discontinuing Metformin because of side effects. A1c increased to 7.1%, so pt re-tried metformin. Held again because it made her "sick". She worked on her diet and lifestyle to control BS ever since.  Sees PCP from "[facility]"? Simvastatin was increased to 20 mg from 10 mg.  Denies breathing difficulties, dysphagia, and dysphonia.    07/18/2023 CC: " I am feeling pretty good. " Pt endorses knee pain when walking. She has seen an orthopedic doctor and will consider knee replacement.  No speech, swallowing or breathing difficulties.     04/15/2024  Pt has POCT 193, /72. CC: "I'm doing alright. Pt reports that she held Ozempic after 2 weeks because she was nervous about the side effects. She states that she hurt her ankle recently and will follow up with a podiatrist.  08/12/2024  Pt has POCT 156, /84 and BMI 39.57. She gained 4 lbs in 12 months. CC: "I lost a few lbs and I'm going to orthopedics to consider a knee replacement at [place] due to bone-on-bone." Pt states that her surgeon wanted her to lower her BMI/weight for better outcomes. She recently received steroid shots last week. Pt had tried Ozempic for 2 weeks in the past, but she was worried about the side effects seen on television. As a result, she is taking Metformin once at night (daytime use affected her mood).  Pt has plans to travel to [place] and [facility].  - 07/30/24 s. creat 0.83, LDL-c 78, A1c 6.1%, TSH 2.43, free T4 1.64, thyroglobulin negative.  [Medications verified as per pt on 08/12/2024] Current Medications: Metformin 850 mg qpm, Levothyroxine 112 mcg (increased from Levothyroxine 100 mcg), Rosuvastatin 10 mg qd, Amlodipine- benazepril 10-20 mg daily, Vitamin D, Vit B complex - Held: metformin (GI side effects), Simvastatin 20 mg qd Medication modified/added this visit:

## 2024-08-14 NOTE — HISTORY OF PRESENT ILLNESS
[FreeTextEntry1] : 73 y/o F pt with Hx of total thyroidectomy for PTCA, central LN neck dissection, 5/8 LN mets (February 6, 2013).  Other PMHx: pre-DM, HTN, gastric bypass in February 2012, mild elevation of intact PTH, knee arthritis. Denies: sleep apnea disorder, fatty liver disease, gout, osteoporosis, fractures.  Vaccination: COVID (02/2021) Saw podiatrist and OB/GYN  10/24/22 Pt has /71, and BMI 40.15. She lost 4 lbs in 1 year.  Today, pt is feeling okay, wit c/o hair loss and itchiness in the face.  Pt notes self-discontinuing Metformin because of side effects. A1c increased to 7.1%, so pt re-tried metformin. Held again because it made her "sick". She worked on her diet and lifestyle to control BS ever since.  Sees PCP from "[facility]"? Simvastatin was increased to 20 mg from 10 mg.  Denies breathing difficulties, dysphagia, and dysphonia.    07/18/2023 CC: " I am feeling pretty good. " Pt endorses knee pain when walking. She has seen an orthopedic doctor and will consider knee replacement.  No speech, swallowing or breathing difficulties.     04/15/2024  Pt has POCT 193, /72. CC: "I'm doing alright. Pt reports that she held Ozempic after 2 weeks because she was nervous about the side effects. She states that she hurt her ankle recently and will follow up with a podiatrist.  08/12/2024  Pt has POCT 156, /84 and BMI 39.57. She gained 4 lbs in 12 months. CC: "I lost a few lbs and I'm going to orthopedics to consider a knee replacement at [place] due to bone-on-bone." Pt states that her surgeon wanted her to lower her BMI/weight for better outcomes. She recently received steroid shots last week. Pt had tried Ozempic for 2 weeks in the past, but she was worried about the side effects seen on television. As a result, she is taking Metformin once at night (daytime use affected her mood).  Pt has plans to travel to [place] and [facility].  - 07/30/24 s. creat 0.83, LDL-c 78, A1c 6.1%, TSH 2.43, free T4 1.64, thyroglobulin negative.  [Medications verified as per pt on 08/12/2024] Current Medications: Metformin 850 mg qpm, Levothyroxine 112 mcg (increased from Levothyroxine 100 mcg), Rosuvastatin 10 mg qd, Amlodipine- benazepril 10-20 mg daily, Vitamin D, Vit B complex - Held: metformin (GI side effects), Simvastatin 20 mg qd Medication modified/added this visit:

## 2025-01-17 ENCOUNTER — APPOINTMENT (OUTPATIENT)
Dept: ENDOCRINOLOGY | Facility: CLINIC | Age: 75
End: 2025-01-17

## 2025-01-17 DIAGNOSIS — Z90.89 OTHER SPECIFIED POSTPROCEDURAL STATES: ICD-10-CM

## 2025-01-17 DIAGNOSIS — E11.9 TYPE 2 DIABETES MELLITUS W/OUT COMPLICATIONS: ICD-10-CM

## 2025-01-17 DIAGNOSIS — E66.01 MORBID (SEVERE) OBESITY DUE TO EXCESS CALORIES: ICD-10-CM

## 2025-01-17 DIAGNOSIS — Z98.890 OTHER SPECIFIED POSTPROCEDURAL STATES: ICD-10-CM

## 2025-01-17 PROCEDURE — G2211 COMPLEX E/M VISIT ADD ON: CPT | Mod: 2W

## 2025-01-17 PROCEDURE — 99214 OFFICE O/P EST MOD 30 MIN: CPT | Mod: 2W

## 2025-02-10 ENCOUNTER — APPOINTMENT (OUTPATIENT)
Dept: ENDOCRINOLOGY | Facility: CLINIC | Age: 75
End: 2025-02-10
Payer: MEDICARE

## 2025-02-10 VITALS
HEART RATE: 71 BPM | DIASTOLIC BLOOD PRESSURE: 70 MMHG | SYSTOLIC BLOOD PRESSURE: 157 MMHG | BODY MASS INDEX: 36.5 KG/M2 | WEIGHT: 190 LBS

## 2025-02-10 DIAGNOSIS — E11.9 TYPE 2 DIABETES MELLITUS W/OUT COMPLICATIONS: ICD-10-CM

## 2025-02-10 DIAGNOSIS — Z98.890 OTHER SPECIFIED POSTPROCEDURAL STATES: ICD-10-CM

## 2025-02-10 DIAGNOSIS — M85.851 OTHER SPECIFIED DISORDERS OF BONE DENSITY AND STRUCTURE, RIGHT THIGH: ICD-10-CM

## 2025-02-10 DIAGNOSIS — Z90.89 OTHER SPECIFIED POSTPROCEDURAL STATES: ICD-10-CM

## 2025-02-10 DIAGNOSIS — E66.01 MORBID (SEVERE) OBESITY DUE TO EXCESS CALORIES: ICD-10-CM

## 2025-02-10 LAB
GLUCOSE BLDC GLUCOMTR-MCNC: 117
HBA1C MFR BLD HPLC: 6

## 2025-02-10 PROCEDURE — 99215 OFFICE O/P EST HI 40 MIN: CPT | Mod: 25

## 2025-02-10 PROCEDURE — 83036 HEMOGLOBIN GLYCOSYLATED A1C: CPT | Mod: QW

## 2025-02-10 PROCEDURE — 82962 GLUCOSE BLOOD TEST: CPT

## 2025-08-27 ENCOUNTER — APPOINTMENT (OUTPATIENT)
Dept: ENDOCRINOLOGY | Facility: CLINIC | Age: 75
End: 2025-08-27
Payer: MEDICARE

## 2025-08-27 VITALS
WEIGHT: 183 LBS | SYSTOLIC BLOOD PRESSURE: 136 MMHG | BODY MASS INDEX: 35.15 KG/M2 | DIASTOLIC BLOOD PRESSURE: 81 MMHG | HEART RATE: 65 BPM

## 2025-08-27 DIAGNOSIS — Z98.890 OTHER SPECIFIED POSTPROCEDURAL STATES: ICD-10-CM

## 2025-08-27 DIAGNOSIS — Z90.89 OTHER SPECIFIED POSTPROCEDURAL STATES: ICD-10-CM

## 2025-08-27 DIAGNOSIS — E11.9 TYPE 2 DIABETES MELLITUS W/OUT COMPLICATIONS: ICD-10-CM

## 2025-08-27 DIAGNOSIS — M85.851 OTHER SPECIFIED DISORDERS OF BONE DENSITY AND STRUCTURE, RIGHT THIGH: ICD-10-CM

## 2025-08-27 PROCEDURE — 82962 GLUCOSE BLOOD TEST: CPT

## 2025-08-27 PROCEDURE — 99215 OFFICE O/P EST HI 40 MIN: CPT | Mod: 25

## 2025-08-27 RX ORDER — ROSUVASTATIN CALCIUM 10 MG/1
10 TABLET, FILM COATED ORAL
Qty: 90 | Refills: 3 | Status: ACTIVE | COMMUNITY
Start: 2025-08-27 | End: 1900-01-01

## 2025-09-03 LAB — GLUCOSE BLDC GLUCOMTR-MCNC: 109
